# Patient Record
Sex: MALE | Race: WHITE | Employment: OTHER | URBAN - METROPOLITAN AREA
[De-identification: names, ages, dates, MRNs, and addresses within clinical notes are randomized per-mention and may not be internally consistent; named-entity substitution may affect disease eponyms.]

---

## 2020-11-20 ENCOUNTER — HOSPITAL ENCOUNTER (EMERGENCY)
Facility: CLINIC | Age: 35
Discharge: HOME | End: 2020-11-20
Attending: PHYSICIAN ASSISTANT | Admitting: PHYSICIAN ASSISTANT

## 2020-11-20 VITALS
WEIGHT: 211 LBS | RESPIRATION RATE: 20 BRPM | DIASTOLIC BLOOD PRESSURE: 96 MMHG | SYSTOLIC BLOOD PRESSURE: 138 MMHG | HEART RATE: 79 BPM | TEMPERATURE: 97.9 F | OXYGEN SATURATION: 98 %

## 2020-11-20 DIAGNOSIS — S05.00XA CORNEAL ABRASION: ICD-10-CM

## 2020-11-20 PROCEDURE — 99284 EMERGENCY DEPT VISIT MOD MDM: CPT | Performed by: PHYSICIAN ASSISTANT

## 2020-11-20 PROCEDURE — 99283 EMERGENCY DEPT VISIT LOW MDM: CPT | Performed by: PHYSICIAN ASSISTANT

## 2020-11-20 PROCEDURE — 250N000009 HC RX 250: Performed by: PHYSICIAN ASSISTANT

## 2020-11-20 RX ORDER — TETRACAINE HYDROCHLORIDE 5 MG/ML
1-2 SOLUTION OPHTHALMIC ONCE
Status: COMPLETED | OUTPATIENT
Start: 2020-11-20 | End: 2020-11-20

## 2020-11-20 RX ORDER — ERYTHROMYCIN 5 MG/G
0.5 OINTMENT OPHTHALMIC 4 TIMES DAILY
Qty: 1 TUBE | Refills: 0 | Status: SHIPPED | OUTPATIENT
Start: 2020-11-20 | End: 2020-11-23

## 2020-11-20 RX ADMIN — TETRACAINE HYDROCHLORIDE 2 DROP: 5 SOLUTION OPHTHALMIC at 16:50

## 2020-11-20 ASSESSMENT — VISUAL ACUITY: OU: 1

## 2020-11-20 NOTE — DISCHARGE INSTRUCTIONS
It was a pleasure working with you today!  I hope your condition improves rapidly!     You did have an abrasion on your cornea.  We did not find any foreign body in the eye, but this could have been rinsed out with the irrigation that was performed.  Please start using the eye ointment right away as soon as you get it and get a repeat dose in prior to bed.  Use this 4 times per day for the next 2-3 days.  Your symptoms should be significantly better in the next 24-36 hours.  Return if her symptoms are not improving.  It also helps to use a cool compress such as a cold washcloth over the eye for 15-20 minutes every couple hours.  You can also use ibuprofen 600 mg every 6 hours as needed for irritation and discomfort.

## 2020-11-20 NOTE — ED AVS SNAPSHOT
[facility]  [address] [place] [address]  Phone: [phone]  Fax: [phone]                                    Mihai Alegria   MRN: [id number]    Department: [facility]   Date of Visit: 11/20/2020           After Visit Summary Signature Page    I have received my discharge instructions, and my questions have been answered. I have discussed any challenges I see with this plan with the nurse or doctor.    ..........................................................................................................................................  Patient/Patient Representative Signature      ..........................................................................................................................................  Patient Representative Print Name and Relationship to Patient    ..................................................               ................................................  Date                                   Time    ..........................................................................................................................................  Reviewed by Signature/Title    ...................................................              ..............................................  Date                                               Time          22EPIC Rev 08/18

## 2020-11-21 NOTE — ED PROVIDER NOTES
History     Chief Complaint   Patient presents with     Eye Problem     HPI  Mihai Alegria is a 35 year old male who presents for evaluation of possible foreign body in the left eye that occurred just prior to arrival.  He was using a sawsall above his head when he felt like some material got in his left eye.  He tried rinsing his eye out, but still has a sensation of foreign body.  Eye irritation.  No all right pain.  He states that he just has a feeling like he has to rub his eye constantly.  Denies any change in his visual acuity.  No diplopia or blurry vision.  Denies the chance of penetrating trauma.  He was not wearing eye protection at the time.  This happened at home, and not in the workplace.        Allergies:  No Known Allergies    Problem List:    There are no active problems to display for this patient.       Past Medical History:    History reviewed. No pertinent past medical history.    Past Surgical History:    History reviewed. No pertinent surgical history.    Family History:    History reviewed. No pertinent family history.    Social History:  Marital Status:  Single [1]  Social History     Tobacco Use     Smoking status: Current Every Day Smoker     Packs/day: 0.50     Smokeless tobacco: Never Used   Substance Use Topics     Alcohol use: Not Currently     Drug use: Not Currently        Medications:         erythromycin (ROMYCIN) 5 MG/GM ophthalmic ointment          Review of Systems   All other systems reviewed and are negative.      Physical Exam   BP: (!) 138/96  Pulse: 79  Temp: 97.9  F (36.6  C)  Resp: 16  Weight: 95.7 kg (211 lb)  SpO2: 98 %      Physical Exam  Vitals signs and nursing note reviewed.   Constitutional:       General: He is not in acute distress.     Appearance: He is not diaphoretic.   HENT:      Head: Normocephalic and atraumatic.      Right Ear: External ear normal.      Left Ear: External ear normal.      Nose: Nose normal.      Mouth/Throat:      Pharynx: No  oropharyngeal exudate.   Eyes:      General: Lids are normal. Lids are everted, no foreign bodies appreciated. Vision grossly intact. Gaze aligned appropriately. No visual field deficit or scleral icterus.        Right eye: No foreign body or discharge.         Left eye: No foreign body, discharge or hordeolum.      Extraocular Movements: Extraocular movements intact.      Conjunctiva/sclera: Conjunctivae normal.      Left eye: Left conjunctiva is not injected. No chemosis, exudate or hemorrhage.     Pupils: Pupils are equal, round, and reactive to light.        Comments: No foreign body appreciated.  Magnifying glass used.  Eyelids flipped and no foreign body present.  Eye was irrigated with 10 cc of normal saline.  Repeat exam with no foreign body even with flipping the eyelid again.  Fluorescein applied and corneal abrasion as noted above identified.  Tetracaine used for anesthesia prior to the exam.   Neck:      Musculoskeletal: Normal range of motion and neck supple.      Thyroid: No thyromegaly.   Cardiovascular:      Rate and Rhythm: Normal rate and regular rhythm.      Heart sounds: Normal heart sounds. No murmur.   Pulmonary:      Effort: Pulmonary effort is normal. No respiratory distress.      Breath sounds: Normal breath sounds. No wheezing or rales.   Chest:      Chest wall: No tenderness.   Abdominal:      General: Bowel sounds are normal. There is no distension.      Palpations: Abdomen is soft. There is no mass.      Tenderness: There is no abdominal tenderness. There is no guarding or rebound.   Musculoskeletal: Normal range of motion.         General: No tenderness or deformity.   Lymphadenopathy:      Cervical: No cervical adenopathy.   Skin:     General: Skin is warm and dry.      Capillary Refill: Capillary refill takes less than 2 seconds.      Findings: No erythema or rash.   Neurological:      Mental Status: He is alert and oriented to person, place, and time.      Cranial Nerves: No cranial  nerve deficit.   Psychiatric:         Behavior: Behavior normal.         Thought Content: Thought content normal.         ED Course        Procedures               Critical Care time:  none               No results found for this or any previous visit (from the past 24 hour(s)).    Medications   tetracaine (PONTOCAINE) 0.5 % ophthalmic solution 1-2 drop (2 drops Left Eye Given by Other Clinician 11/20/20 1650)       Assessments & Plan (with Medical Decision Making)  Corneal abrasion     35 year old male presents for evaluation of a possible foreign body in his left eye that occurred just prior to arrival.  Please see HPI for details.  Denies any visual acuity issues.  On exam his vital signs are reassuring.  No sign of major eye trauma.  No foreign body appreciated.  Tetracaine used.  Fluorescein demonstrated a central corneal abrasion.  No other abnormalities identified.  Erythromycin ointment prescribed.  Follow-up in 24-48 hours if symptoms not significantly improved and resolved.  Ibuprofen up to 600 mg every 6 hours as needed for irritation.  Cool compresses discussed.  ED return instructions reviewed.  He was in agreement with this plan and was suitable for discharge.     I have reviewed the nursing notes.    I have reviewed the findings, diagnosis, plan and need for follow up with the patient.       Discharge Medication List as of 11/20/2020  5:17 PM      START taking these medications    Details   erythromycin (ROMYCIN) 5 MG/GM ophthalmic ointment Place 0.5 inches Into the left eye 4 times daily for 3 daysDisp-1 Tube, [id number]             Final diagnoses:   Corneal abrasion       Disclaimer: This note consists of symbols derived from keyboarding, dictation and/or voice recognition software. As a result, there may be errors in the script that have gone undetected. Please consider this when interpreting information found in this chart.      11/20/2020   [facility] EMERGENCY DEPT      Stephen Wilcox PA-C  11/20/20 2019